# Patient Record
Sex: MALE | Employment: FULL TIME | URBAN - METROPOLITAN AREA
[De-identification: names, ages, dates, MRNs, and addresses within clinical notes are randomized per-mention and may not be internally consistent; named-entity substitution may affect disease eponyms.]

---

## 2020-06-08 DIAGNOSIS — Z13.9 ENCOUNTER FOR SCREENING: Primary | ICD-10-CM

## 2020-06-08 DIAGNOSIS — Z03.818 ENCOUNTER FOR OBSERVATION FOR SUSPECTED EXPOSURE TO OTHER BIOLOGICAL AGENTS RULED OUT: ICD-10-CM

## 2020-07-08 DIAGNOSIS — Z20.822 SUSPECTED COVID-19 VIRUS INFECTION: ICD-10-CM

## 2020-08-04 DIAGNOSIS — Z03.818 ENCOUNTER FOR OBSERVATION FOR SUSPECTED EXPOSURE TO OTHER BIOLOGICAL AGENTS RULED OUT: ICD-10-CM

## 2020-08-04 DIAGNOSIS — Z20.822 SUSPECTED COVID-19 VIRUS INFECTION: ICD-10-CM

## 2020-08-09 ENCOUNTER — HOSPITAL ENCOUNTER (EMERGENCY)
Facility: HOSPITAL | Age: 40
Discharge: HOME | End: 2020-08-09
Attending: EMERGENCY MEDICINE
Payer: COMMERCIAL

## 2020-08-09 VITALS
OXYGEN SATURATION: 97 % | DIASTOLIC BLOOD PRESSURE: 77 MMHG | RESPIRATION RATE: 16 BRPM | SYSTOLIC BLOOD PRESSURE: 113 MMHG | TEMPERATURE: 98 F | HEART RATE: 75 BPM

## 2020-08-09 DIAGNOSIS — R50.9 ACUTE FEBRILE ILLNESS: ICD-10-CM

## 2020-08-09 DIAGNOSIS — Z20.822 COVID-19 VIRUS NOT DETECTED: ICD-10-CM

## 2020-08-09 DIAGNOSIS — R19.7 DIARRHEA, UNSPECIFIED TYPE: Primary | ICD-10-CM

## 2020-08-09 LAB — SARS-COV-2 RDRP RESP QL NAA+PROBE: NEGATIVE

## 2020-08-09 PROCEDURE — 87046 STOOL CULTR AEROBIC BACT EA: CPT

## 2020-08-09 PROCEDURE — 63700000 PHARM REV CODE 250 ALT 637 W/O HCPCS: Performed by: PHYSICIAN ASSISTANT

## 2020-08-09 PROCEDURE — 87427 SHIGA-LIKE TOXIN AG IA: CPT | Mod: 59

## 2020-08-09 PROCEDURE — 99283 EMERGENCY DEPT VISIT LOW MDM: CPT

## 2020-08-09 PROCEDURE — 87045 FECES CULTURE AEROBIC BACT: CPT

## 2020-08-09 PROCEDURE — 99284 EMERGENCY DEPT VISIT MOD MDM: CPT | Mod: ,,, | Performed by: PHYSICIAN ASSISTANT

## 2020-08-09 PROCEDURE — U0002 COVID-19 LAB TEST NON-CDC: HCPCS

## 2020-08-09 PROCEDURE — 99284 PR EMERGENCY DEPT VISIT,LEVEL IV: ICD-10-PCS | Mod: ,,, | Performed by: PHYSICIAN ASSISTANT

## 2020-08-09 RX ORDER — AZITHROMYCIN 250 MG/1
1000 TABLET, FILM COATED ORAL
Status: COMPLETED | OUTPATIENT
Start: 2020-08-09 | End: 2020-08-09

## 2020-08-09 RX ADMIN — AZITHROMYCIN 1000 MG: 250 TABLET, FILM COATED ORAL at 12:08

## 2020-08-09 NOTE — DISCHARGE INSTRUCTIONS
Your COVID test is negative.  You received antibiotic treatment (one time dose) for bacterial causes of diarrhea.  You should rest. Do not take any anti-diarrheal medication.  You should continue to drink plenty of fluids.      Return to the ER for any new or significantly worsening symptoms such as greater than 10 episodes of watery stool a day, blood in the stool, severe abdominal pain, persistent fevers despite treatment or fever greater than 104° weakness, dizziness, lightheadedness, fainting  or any other worrisome symptoms.    Our goal in the emergency department is to always give you outstanding care and exceptional service. You may receive a survey by mail or e-mail in the next week regarding your experience in our ED. We would greatly appreciate your completing and returning the survey. Your feedback provides us with a way to recognize our staff who give very good care and it helps us learn how to improve when your experience was below our aspiration of excellence.

## 2020-08-09 NOTE — ED PROVIDER NOTES
Encounter Date: 8/9/2020    SCRIBE #1 NOTE: I, Alanis Cantu, am scribing for, and in the presence of,  Dr. Velazquez. I have scribed the following portions of the note - the APC attestation.       History     Chief Complaint   Patient presents with    Diarrhea     arrived [facility] [place]     39-year-old male with no significant past medical history presents to the ED with diarrhea and fever.  Patient reports fever up to 101.4 that began yesterday.  Patient reports diarrhea began today.  He has had 4-5 episodes of watery stool today.  Patient works on an oil rig      On oil rig. Diarrhea, fever 101.4. Diarrhea today 4-5x watery. Other workers with diarrhea on rig.  back aches, fatigue. No n/v. No cough, sob.         Review of patient's allergies indicates:  No Known Allergies  History reviewed. No pertinent past medical history.  History reviewed. No pertinent surgical history.  History reviewed. No pertinent family history.  Social History     Tobacco Use    Smoking status: Never Smoker   Substance Use Topics    Alcohol use: Yes     Comment: occasional    Drug use: Never     Review of Systems   Constitutional: Positive for fever.   HENT: Negative for sore throat.    Respiratory: Negative for cough and shortness of breath.    Cardiovascular: Negative for chest pain.   Gastrointestinal: Positive for diarrhea. Negative for abdominal pain, nausea and vomiting.   Genitourinary: Negative for dysuria.   Musculoskeletal: Negative for back pain.   Skin: Negative for rash.   Neurological: Negative for weakness.   Hematological: Does not bruise/bleed easily.       Physical Exam     Initial Vitals [08/09/20 0949]   BP Pulse Resp Temp SpO2   120/80 83 20 99.3 °F (37.4 °C) 98 %      MAP       --         Physical Exam    Nursing note and vitals reviewed.  Constitutional: He appears well-developed and well-nourished.  Non-toxic appearance. He does not appear  ill. No distress.   HENT:   Head: Normocephalic and atraumatic.   Neck: Normal range of motion. Neck supple.   Cardiovascular: Normal rate and regular rhythm. Exam reveals no gallop, no distant heart sounds and no friction rub.    No murmur heard.  Pulmonary/Chest: Effort normal and breath sounds normal. No accessory muscle usage. No tachypnea. No respiratory distress. He has no decreased breath sounds. He has no wheezes. He has no rhonchi. He has no rales.   Abdominal: He exhibits no distension.   Neurological: He is alert.   Skin: No rash noted.         ED Course   Procedures  Labs Reviewed   CULTURE, STOOL   ENTEROHEMORRHAGIC E.COLI   SARS-COV-2 RNA AMPLIFICATION, QUAL          Imaging Results    None          Medical Decision Making:   History:   Old Medical Records: I decided to obtain old medical records.  Differential Diagnosis:   My differential diagnosis includes but is not limited to:  COVID 19 infection, e.coli diarrhea, vibrio, enteritis, colitis  Clinical Tests:   Lab Tests: Ordered  Other:   I have discussed this case with another health care provider.       APC / Resident Notes:   39-year-old male presents the ED with fever and diarrhea. Pt works on oil rig.  Patient is afebrile.  Vitals within normal limits.  He is afebrile, nontoxic appearing, no acute distress.  Abdomen soft and nontender.    COVID test is negative.  Stool was sent for culture.  Patient was treated with a 1 time dose of azithromycin 1 g to cover for infectious causes.  I will discharge patient in stable condition.  Advised to avoid antidiarrheals and increase oral fluid intake.  ED return precautions given.  Patient verbalized understanding and is agreeable with plan.    I have reviewed the patient's records and discussed this case with my supervising physician. Please be advised that this text was dictated with [employer] software and may contain dictation errors.              Scribe Attestation:   Scribe #1: I performed the above  scribed service and the documentation accurately describes the services I performed. I attest to the accuracy of the note.    Attending Attestation:     Physician Attestation Statement for NP/PA:   I discussed this assessment and plan of this patient with the NP/PA, but I did not personally examine the patient. The face to face encounter was performed by the NP/PA.    Other NP/PA Attestation Additions:    History of Present Illness: Patient is a 39 year old male with no pertinent PMHx, who presents to the ED with a chief complaint of fever and diarrhea. Endorses a fever with Tmax of 101.4 that began yesterday and 4-5 episodes of watery, non black/non red stools starting today. He works on an oil rig and reports that other men on the rig also began experiencing fevers and diarrhea after eating seafood. Sent here to be covid tested and have a stool sample. Denies taking any antipyretics today.                                  Clinical Impression:       ICD-10-CM ICD-9-CM   1. Diarrhea, unspecified type  R19.7 787.91   2. Acute febrile illness  R50.9 780.60   3. Covid-19 Virus not Detected  [id number]          Disposition:   Disposition: Discharged  Condition: Stable     ED Disposition Condition    Discharge Stable        ED Prescriptions     None        Follow-up Information    None                                    Felicita Bauer PA-C  08/09/20 1402       Angelique Velazquez MD  08/09/20 1902

## 2020-08-09 NOTE — ED TRIAGE NOTES
Elbert Kyle, a 39 y.o. male presents to the ED via [facility] with CC fever onset yesterday, diarrhea onset today. Denies cough SOB nausea or any pain.    Patient identifiers verified verbally with patient and correct for Elbert Kyle.    SKIN: Skin is warm dry and intact, and color is consistent with ethnicity. Capillary refill <3 seconds. No breakdown or brusing visible. Mucus membranes moist, acyanotic.  RESPIRATORY: Airway is open and patent. Respirations-spontaneous, unlabored, regular rate, equal bilaterally on inspiration and expiration. No accessory muscle use noted. Lungs clear to auscultation in all fields bilaterally anterior and posterior.   CARDIAC: Patient has regular heart rate.  No peripheral edema noted, and patient has no c/o chest pain. Peripheral pulses present equal and strong throughout.  ABDOMEN: Soft and non-tender to palpation with no distention noted. Normoactive bowel sounds x4 quadrants. Pt has no complaints diarrhea, denies blood in stool. Pt reports normal appetite.   NEUROLOGIC: AAOx4. Eyes open spontaneously and facial expression symmetrical. Pt behavior appropriate to situation, and pt follows commands. Pt reports sensation present in all extremities when touched with a finger, denies any numbness or tingling. PERRLA  MUSCULOSKELETAL: Spontaneous movement noted to all extremities. Hand  equal and leg strength strong +5 bilaterally.   : No complaints of frequency, burning, urgency or blood in the urine. No complaints of incontinence.

## 2020-08-09 NOTE — Clinical Note
"Elbert"Tapan Kyle was seen and treated in our emergency department on 8/9/2020.     COVID-19 is present in our communities across the state. There is limited testing for COVID at this time, so not all patients can be tested. In this situation, your employee meets the following criteria:    Elbert Kyle has met the criteria for COVID-19 testing and has a NEGATIVE result. The employee can return to work once they are asymptomatic for 72 hours without the use of fever reducing medications (Tylenol, Motrin, etc).     If you have any questions or concerns, or if I can be of further assistance, please do not hesitate to contact me.    Sincerely,             Felicita Bauer PA-C"

## 2020-08-10 LAB
E COLI SXT1 STL QL IA: NEGATIVE
E COLI SXT2 STL QL IA: NEGATIVE

## 2020-08-12 LAB — BACTERIA STL CULT: NORMAL

## 2020-09-29 DIAGNOSIS — Z03.818 ENCOUNTER FOR OBSERVATION FOR SUSPECTED EXPOSURE TO OTHER BIOLOGICAL AGENTS RULED OUT: ICD-10-CM

## 2020-10-01 ENCOUNTER — LAB VISIT (OUTPATIENT)
Dept: URGENT CARE | Facility: CLINIC | Age: 40
End: 2020-10-01
Payer: COMMERCIAL

## 2020-10-01 DIAGNOSIS — Z03.818 ENCOUNTER FOR OBSERVATION FOR SUSPECTED EXPOSURE TO OTHER BIOLOGICAL AGENTS RULED OUT: ICD-10-CM

## 2020-10-01 LAB — SARS-COV-2 RNA RESP QL NAA+PROBE: NOT DETECTED

## 2020-10-01 PROCEDURE — U0003 INFECTIOUS AGENT DETECTION BY NUCLEIC ACID (DNA OR RNA); SEVERE ACUTE RESPIRATORY SYNDROME CORONAVIRUS 2 (SARS-COV-2) (CORONAVIRUS DISEASE [COVID-19]), AMPLIFIED PROBE TECHNIQUE, MAKING USE OF HIGH THROUGHPUT TECHNOLOGIES AS DESCRIBED BY CMS-2020-01-R: HCPCS

## 2020-11-24 DIAGNOSIS — Z03.818 ENCOUNTER FOR OBSERVATION FOR SUSPECTED EXPOSURE TO OTHER BIOLOGICAL AGENTS RULED OUT: ICD-10-CM

## 2020-11-25 ENCOUNTER — LAB VISIT (OUTPATIENT)
Dept: URGENT CARE | Facility: CLINIC | Age: 40
End: 2020-11-25
Payer: COMMERCIAL

## 2020-11-25 DIAGNOSIS — Z03.818 ENCOUNTER FOR OBSERVATION FOR SUSPECTED EXPOSURE TO OTHER BIOLOGICAL AGENTS RULED OUT: ICD-10-CM

## 2020-11-25 LAB — SARS-COV-2 RNA RESP QL NAA+PROBE: NOT DETECTED

## 2020-11-25 PROCEDURE — U0003 INFECTIOUS AGENT DETECTION BY NUCLEIC ACID (DNA OR RNA); SEVERE ACUTE RESPIRATORY SYNDROME CORONAVIRUS 2 (SARS-COV-2) (CORONAVIRUS DISEASE [COVID-19]), AMPLIFIED PROBE TECHNIQUE, MAKING USE OF HIGH THROUGHPUT TECHNOLOGIES AS DESCRIBED BY CMS-2020-01-R: HCPCS

## 2021-01-19 DIAGNOSIS — Z20.822 ENCOUNTER FOR LABORATORY TESTING FOR COVID-19 VIRUS: ICD-10-CM

## 2021-01-20 ENCOUNTER — LAB VISIT (OUTPATIENT)
Dept: URGENT CARE | Facility: CLINIC | Age: 41
End: 2021-01-20
Payer: COMMERCIAL

## 2021-01-20 DIAGNOSIS — Z20.822 ENCOUNTER FOR LABORATORY TESTING FOR COVID-19 VIRUS: ICD-10-CM

## 2021-01-20 LAB — SARS-COV-2 RNA RESP QL NAA+PROBE: NOT DETECTED

## 2021-01-20 PROCEDURE — U0003 INFECTIOUS AGENT DETECTION BY NUCLEIC ACID (DNA OR RNA); SEVERE ACUTE RESPIRATORY SYNDROME CORONAVIRUS 2 (SARS-COV-2) (CORONAVIRUS DISEASE [COVID-19]), AMPLIFIED PROBE TECHNIQUE, MAKING USE OF HIGH THROUGHPUT TECHNOLOGIES AS DESCRIBED BY CMS-2020-01-R: HCPCS

## 2021-03-16 DIAGNOSIS — Z20.822 ENCOUNTER FOR LABORATORY TESTING FOR COVID-19 VIRUS: ICD-10-CM

## 2021-03-17 ENCOUNTER — LAB VISIT (OUTPATIENT)
Dept: URGENT CARE | Facility: CLINIC | Age: 41
End: 2021-03-17
Payer: COMMERCIAL

## 2021-03-17 DIAGNOSIS — Z20.822 ENCOUNTER FOR LABORATORY TESTING FOR COVID-19 VIRUS: ICD-10-CM

## 2021-03-17 LAB — SARS-COV-2 RNA RESP QL NAA+PROBE: NOT DETECTED

## 2021-03-17 PROCEDURE — U0003 INFECTIOUS AGENT DETECTION BY NUCLEIC ACID (DNA OR RNA); SEVERE ACUTE RESPIRATORY SYNDROME CORONAVIRUS 2 (SARS-COV-2) (CORONAVIRUS DISEASE [COVID-19]), AMPLIFIED PROBE TECHNIQUE, MAKING USE OF HIGH THROUGHPUT TECHNOLOGIES AS DESCRIBED BY CMS-2020-01-R: HCPCS | Performed by: EMERGENCY MEDICINE

## 2021-05-11 ENCOUNTER — LAB VISIT (OUTPATIENT)
Dept: URGENT CARE | Facility: CLINIC | Age: 41
End: 2021-05-11
Payer: COMMERCIAL

## 2021-05-11 DIAGNOSIS — Z20.822 ENCOUNTER FOR LABORATORY TESTING FOR COVID-19 VIRUS: ICD-10-CM

## 2021-05-11 LAB — SARS-COV-2 RNA RESP QL NAA+PROBE: NOT DETECTED

## 2021-05-11 PROCEDURE — U0003 INFECTIOUS AGENT DETECTION BY NUCLEIC ACID (DNA OR RNA); SEVERE ACUTE RESPIRATORY SYNDROME CORONAVIRUS 2 (SARS-COV-2) (CORONAVIRUS DISEASE [COVID-19]), AMPLIFIED PROBE TECHNIQUE, MAKING USE OF HIGH THROUGHPUT TECHNOLOGIES AS DESCRIBED BY CMS-2020-01-R: HCPCS | Performed by: EMERGENCY MEDICINE

## 2021-06-21 DIAGNOSIS — Z20.822 ENCOUNTER FOR LABORATORY TESTING FOR COVID-19 VIRUS: ICD-10-CM

## 2021-06-22 ENCOUNTER — LAB VISIT (OUTPATIENT)
Dept: URGENT CARE | Facility: CLINIC | Age: 41
End: 2021-06-22

## 2021-06-22 DIAGNOSIS — Z20.822 ENCOUNTER FOR LABORATORY TESTING FOR COVID-19 VIRUS: ICD-10-CM

## 2021-06-22 LAB — SARS-COV-2 RNA RESP QL NAA+PROBE: NOT DETECTED

## 2021-06-22 PROCEDURE — U0003 INFECTIOUS AGENT DETECTION BY NUCLEIC ACID (DNA OR RNA); SEVERE ACUTE RESPIRATORY SYNDROME CORONAVIRUS 2 (SARS-COV-2) (CORONAVIRUS DISEASE [COVID-19]), AMPLIFIED PROBE TECHNIQUE, MAKING USE OF HIGH THROUGHPUT TECHNOLOGIES AS DESCRIBED BY CMS-2020-01-R: HCPCS | Performed by: EMERGENCY MEDICINE

## 2025-07-18 ENCOUNTER — HOSPITAL ENCOUNTER (OUTPATIENT)
Dept: RADIOLOGY | Facility: HOSPITAL | Age: 45
Discharge: HOME | End: 2025-07-18
Attending: FAMILY MEDICINE

## 2025-07-18 DIAGNOSIS — Z00.00 ANNUAL PHYSICAL EXAM: ICD-10-CM

## 2025-07-18 DIAGNOSIS — Z00.00 ANNUAL PHYSICAL EXAM: Primary | ICD-10-CM

## 2025-07-18 PROCEDURE — 71045 X-RAY EXAM CHEST 1 VIEW: CPT | Mod: 26,,, | Performed by: STUDENT IN AN ORGANIZED HEALTH CARE EDUCATION/TRAINING PROGRAM

## 2025-07-18 PROCEDURE — 71045 X-RAY EXAM CHEST 1 VIEW: CPT | Mod: TC,PN
